# Patient Record
Sex: MALE | Race: WHITE
[De-identification: names, ages, dates, MRNs, and addresses within clinical notes are randomized per-mention and may not be internally consistent; named-entity substitution may affect disease eponyms.]

---

## 2018-01-06 ENCOUNTER — HOSPITAL ENCOUNTER (EMERGENCY)
Dept: HOSPITAL 25 - UCCORT | Age: 53
Discharge: ANOTHER HOSPITAL | End: 2018-01-06
Payer: COMMERCIAL

## 2018-01-06 VITALS — SYSTOLIC BLOOD PRESSURE: 157 MMHG | DIASTOLIC BLOOD PRESSURE: 87 MMHG

## 2018-01-06 DIAGNOSIS — Z88.2: ICD-10-CM
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
## 2018-01-06 DIAGNOSIS — Z88.0: ICD-10-CM
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
## 2018-01-06 DIAGNOSIS — S06.0X9A: ICD-10-CM

## 2018-01-06 DIAGNOSIS — S01.91XA: Primary | ICD-10-CM
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
## 2018-01-06 PROCEDURE — G0463 HOSPITAL OUTPT CLINIC VISIT: HCPCS

## 2018-01-06 PROCEDURE — 99212 OFFICE O/P EST SF 10 MIN: CPT
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
